# Patient Record
Sex: FEMALE | Race: WHITE | NOT HISPANIC OR LATINO | Employment: FULL TIME | URBAN - METROPOLITAN AREA
[De-identification: names, ages, dates, MRNs, and addresses within clinical notes are randomized per-mention and may not be internally consistent; named-entity substitution may affect disease eponyms.]

---

## 2017-01-18 ENCOUNTER — RADIANT APPOINTMENT (OUTPATIENT)
Dept: MAMMOGRAPHY | Facility: CLINIC | Age: 42
End: 2017-01-18
Attending: OBSTETRICS & GYNECOLOGY
Payer: COMMERCIAL

## 2017-01-18 DIAGNOSIS — Z12.31 VISIT FOR SCREENING MAMMOGRAM: ICD-10-CM

## 2017-01-18 PROCEDURE — G0202 SCR MAMMO BI INCL CAD: HCPCS | Mod: TC

## 2017-04-19 ENCOUNTER — OFFICE VISIT (OUTPATIENT)
Dept: OBGYN | Facility: CLINIC | Age: 42
End: 2017-04-19
Payer: COMMERCIAL

## 2017-04-19 VITALS
DIASTOLIC BLOOD PRESSURE: 62 MMHG | HEIGHT: 67 IN | BODY MASS INDEX: 22.76 KG/M2 | WEIGHT: 145 LBS | SYSTOLIC BLOOD PRESSURE: 110 MMHG | OXYGEN SATURATION: 98 % | HEART RATE: 90 BPM

## 2017-04-19 DIAGNOSIS — R30.0 DYSURIA: ICD-10-CM

## 2017-04-19 DIAGNOSIS — N76.0 ACUTE VAGINITIS: Primary | ICD-10-CM

## 2017-04-19 LAB
ALBUMIN UR-MCNC: NEGATIVE MG/DL
APPEARANCE UR: CLEAR
BACTERIA #/AREA URNS HPF: ABNORMAL /HPF
BILIRUB UR QL STRIP: NEGATIVE
COLOR UR AUTO: YELLOW
GLUCOSE UR STRIP-MCNC: NEGATIVE MG/DL
HGB UR QL STRIP: ABNORMAL
KETONES UR STRIP-MCNC: NEGATIVE MG/DL
LEUKOCYTE ESTERASE UR QL STRIP: NEGATIVE
MICRO REPORT STATUS: NORMAL
MUCOUS THREADS #/AREA URNS LPF: PRESENT /LPF
NITRATE UR QL: NEGATIVE
NON-SQ EPI CELLS #/AREA URNS LPF: ABNORMAL /LPF
PH UR STRIP: 5 PH (ref 5–7)
RBC #/AREA URNS AUTO: ABNORMAL /HPF (ref 0–2)
SP GR UR STRIP: 1.02 (ref 1–1.03)
SPECIMEN SOURCE: NORMAL
URN SPEC COLLECT METH UR: ABNORMAL
UROBILINOGEN UR STRIP-ACNC: 0.2 EU/DL (ref 0.2–1)
WBC #/AREA URNS AUTO: ABNORMAL /HPF (ref 0–2)
WET PREP SPEC: NORMAL

## 2017-04-19 PROCEDURE — 87210 SMEAR WET MOUNT SALINE/INK: CPT | Performed by: ADVANCED PRACTICE MIDWIFE

## 2017-04-19 PROCEDURE — 99213 OFFICE O/P EST LOW 20 MIN: CPT | Performed by: ADVANCED PRACTICE MIDWIFE

## 2017-04-19 PROCEDURE — 87086 URINE CULTURE/COLONY COUNT: CPT | Performed by: ADVANCED PRACTICE MIDWIFE

## 2017-04-19 PROCEDURE — 81001 URINALYSIS AUTO W/SCOPE: CPT | Performed by: ADVANCED PRACTICE MIDWIFE

## 2017-04-19 RX ORDER — NITROFURANTOIN 25; 75 MG/1; MG/1
100 CAPSULE ORAL 2 TIMES DAILY
Qty: 14 CAPSULE | Refills: 0 | Status: SHIPPED | OUTPATIENT
Start: 2017-04-19 | End: 2019-10-09

## 2017-04-19 NOTE — MR AVS SNAPSHOT
After Visit Summary   4/19/2017    Daniella Canela    MRN: [id number]           Patient Information     Date Of Birth          1975        Visit Information        Provider Department      4/19/2017 9:15 AM Donna Rizvi APRN CNM [facility]        Today's Diagnoses     Acute vaginitis    -  1    Dysuria          Care Instructions    Bladder Infection (UTI'S)    To help reduce the symptoms of your bladder infection:       Drink 8-10 glasses of water every day      Decrease caffeine, sugar and alcohol      Take all of the medication as it has been prescribed, don't stop before the last dose is gone      You may use OTC Uristat or Pyridium (this can turn your urine orange)  to soothe your bladder and reduce the burning but be aware that it may stain your clothing      Take Ibuprofen as directed for pain (not in pregnancy)      Take Vitamin C:  250-500 mg a day, Zinc: 30-50 mg day      Cranactin (tableted cranberry juice concentrate) take 1-2 tablets every 3-4 hours with plenty of water        To prevent future urinary tract infections:    AVOID CHEMICAL IRRITANTS:  Bath gels, perfumed products, deodorant pads or tampons, douching    CLOTHING: That increases moisture and bacterial growth:  nylon, Lycra, panty liners, tight clothing and thong underwear      ACIDIFY URINE: Cranberry tablets (Cranactin) or juice (naturally sweetened) to acidify urine and decrease bacterial growth.     URINATE:  Frequently and before and after intercourse.    If bladder infections are a common problem for you, consider washing your vaginal area before intercourse as well.       If symptoms persist or you experience fever, chills or back pain, please call:    Vaginitis (Vaginal Irritation/Infection)    Vaginitis is very common!  The most common vaginal infections are bacterial vaginosis or yeast. These infections are not sexually transmitted but can be incredibly uncomfortable.  Seek care from your midwife if signs or symptoms arise.     Normal vaginal discharge:      Is white, clear, thick or thin (it may change depending on where you are in your cycle)    Does not have a foul odor    The amount of discharge varies    Abnormal discharge/symptoms:       Itching in and around the vagina    Redness, pain or swelling    Discharge that is foamy, greenish, curd like, or bloody    Foul smelling odor    Pain when urinating or having sex    Fever    Causes of vaginal infections:      Good bacteria from the vagina have been destroyed by bad bacteria    Reaction to something in the vagina such as a tampon or scented/perfumed soaps or bubble bath    STI's    Sensitivities to soaps/detergents/dryer sheets, lubricants, etc.    Hormonal changes    Recent use of antibiotics     Infections can also occur after you've had intercourse with a new partner or if you have had frequent intercourse         Here is a list of suggestions that may help prevent/treat vaginal infections and will help maintain a healthy vaginal environment:      1.  Boosting your immune system so you can heal faster      Make sure you are getting adequate sleep    Drink 2-3 quarts of fluids per day, Cranberries or cranberry juice (unsweetened)    Eat more nuts, grains, raw veggies, yogurt, jarvis, grapefruit    Decrease intake of refined sugar, red meat and alcohol    Echinacea - 3 times a day for chronic problem or every 2 hours for acute symptoms; use as directed on bottle          2.  Changing the vaginal environment to a more acid state       Soak in a warm bath tub with one cup of vinegar or lemon juice. Do not use scented soap, bubble bath, or oils.     Acidophilus capsules:  1 in your vagina at bedtime for 5-7 nights    Herbal sitz bath or reed-wash with:  TBSP tea tree oil or 2 TBS cider vinegar      3.  Increasing the good healthy bacteria      At each meal drink 1 tsp apple cider vinegar and 1 tsp honey in   cup warm  water    Eat garlic daily, capsules or fresh.      Take probiotics 4-8 billion units/day      4.  Preventive measures      Wear cotton underwear, no thongs.  Do not wear tight clothes or pantyhose    Shower soon after working or change out of sweaty clothing     Do not wear underwear to bed.  The vaginal environment needs to breathe    Never douche or use vaginal , the vagina is self-cleaning!    Use white, unscented toilet paper.  Do not use baby wipes.  Wipe from front to back    Use only unscented tampons and pads, buy organic products if desired    Do not use perfumes/oils/lotions near your vagina or take bubble baths    Use only mild, unscented soaps around your vaginal area     Do not use fabric softeners/dryer sheets    Use gentle, unscented detergent, consider buying non-petroleum based detergents    Use only water based lubricants during sexual contact    Abstain from intercourse during times of infection    Alternative Treatment  Boric acid capsules one per vagina (not by mouth!!! Very toxic if taken orally) at bedtime for 5 days (or as suggested by your provider) may be an effective alternative treatment and also more effective for those with chronic yeast vaginitis. Boric acid is available at the pharmacy but must be purchased along with gelatin caps for insertion. It might also be available at a local compounding pharmacy. Boric acid is not safe for pregnant women. Discuss with your midwife if this treatment interests you.     If your symptoms do not resolve or if you have questions please call:                             Follow-ups after your visit        Who to contact     If you have questions or need follow up information about today's clinic visit or your schedule please contact [facility] directly at [phone].  Normal or non-critical lab and imaging results will be communicated to you by MyChart, letter or phone within 4 business days after the clinic has  "received the results. If you do not hear from us within 7 days, please contact the clinic through [employer] or phone. If you have a critical or abnormal lab result, we will notify you by phone as soon as possible.  Submit refill requests through [employer] or call your pharmacy and they will forward the refill request to us. Please allow 3 business days for your refill to be completed.          Additional Information About Your Visit        [employer] Information     [employer] lets you send messages to your doctor, view your test results, renew your prescriptions, schedule appointments and more. To sign up, go to [email or web address] . Click on \"Log in\" on the left side of the screen, which will take you to the Welcome page. Then click on \"Sign up Now\" on the right side of the page.     You will be asked to enter the access code listed below, as well as some personal information. Please follow the directions to create your username and password.     Your access code is: [email or web address]  Expires: 2017  9:27 AM     Your access code will  in 90 days. If you need help or a new code, please call your [facility] clinic or [phone].        Care EveryWhere ID     This is your Care EveryWhere ID. This could be used by other organizations to access your [facility] medical records  [id number]        Your Vitals Were     Pulse Height Pulse Oximetry BMI (Body Mass Index)          90 5' 7\" (1.702 m) 98% 22.71 kg/m2         Blood Pressure from Last 3 Encounters:   17 110/62   12/08/15 120/76   03/09/15 135/80    Weight from Last 3 Encounters:   17 145 lb (65.8 kg)   12/08/15 165 lb (74.8 kg)   03/09/15 157 lb (71.2 kg)              We Performed the Following     UA with Microscopic     Wet prep        Primary Care Provider    None       No address on file        Thank you!     Thank you for choosing [facility]  for your care. Our goal is always to provide you with excellent care. Hearing " back from our patients is one way we can continue to improve our services. Please take a few minutes to complete the written survey that you may receive in the mail after your visit with us. Thank you!             Your Updated Medication List - Protect others around you: Learn how to safely use, store and throw away your medicines at www.disposemymeds.org.          This list is accurate as of: 4/19/17  9:27 AM.  Always use your most recent med list.                   Brand Name Dispense Instructions for use    ADVIL PO      Take 400 mg by mouth Reported on 4/19/2017

## 2017-04-19 NOTE — NURSING NOTE
"Chief Complaint   Patient presents with     Vaginal Problem   x10 days burning upon urination and white discharge   Initial /62 (BP Location: Right arm, Patient Position: Chair, Cuff Size: Adult Regular)  Pulse 90  Ht 5' 7\" (1.702 m)  Wt 145 lb (65.8 kg)  SpO2 98%  BMI 22.71 kg/m2 Estimated body mass index is 22.71 kg/(m^2) as calculated from the following:    Height as of this encounter: 5' 7\" (1.702 m).    Weight as of this encounter: 145 lb (65.8 kg).  Medication Reconciliation: complete   Audrey Lange MA      "

## 2017-04-19 NOTE — PROGRESS NOTES
Please try to get a hold of patient, unable to get through. Most likely has UTI, will send macrobid to pharmacy and will await culture results. Thanks!    DEVON Lockett, CNM

## 2017-04-19 NOTE — PROGRESS NOTES
"SUBJECTIVE:   Daniella is a 42 year old here for vaginal symptoms: burning, it comes and goes but something doesn't seem normal             Vaginal Symptoms     Onset: on and off for a while    Description:  Vaginal Discharge: white and clear  Itching (Pruritis): No  Burning sensation:  Yes  Odor:  No  Irritation:  Yes    Accompanying Signs & Symptoms:  Pain with Urination: Yes  Abdominal Pain:  No  Fever: No   History:   Sexually active:  Yes  New Partner:  No  Possibility of Pregnancy:  No  Contraceptive type: status post hysterectomy    Precipitating factors:   Recent Antibiotic Use: No    Alleviating factors:     Therapies Tried and outcome:   Previous Episodes of Vaginitis:  No      Other associated symptoms: none.  History of STI's:  No  STI Testing offered: YES    LMP: No LMP recorded. Patient has had a hysterectomy.      There is no problem list on file for this patient.    Past Medical History:   Diagnosis Date     NO ACTIVE PROBLEMS      Past Surgical History:   Procedure Laterality Date     HYSTERECTOMY, VAGINAL  9-9-2009     Current Outpatient Prescriptions   Medication Sig Dispense Refill     nitrofurantoin, macrocrystal-monohydrate, (MACROBID) 100 MG capsule Take 1 capsule (100 mg) by mouth 2 times daily 14 capsule 0     Ibuprofen (ADVIL PO) Take 400 mg by mouth Reported on 4/19/2017       Allergies   Allergen Reactions     Codeine Sulfate Rash       Health maintenance updated:  no    ROS:   12 point review of systems negative other than symptoms noted below  : Vaginal burning and burning with urination    PHYSICAL EXAM:    /62 (BP Location: Right arm, Patient Position: Chair, Cuff Size: Adult Regular)  Pulse 90  Ht 5' 7\" (1.702 m)  Wt 145 lb (65.8 kg)  SpO2 98%  BMI 22.71 kg/m2, Body mass index is 22.71 kg/(m^2).  General appearance:  healthy, alert and no distress  Pelvic Exam:  Vulva: No lesions, no adenopathy, BUS:  wnl.   Vagina: Moist, pink, discharge normal  well rugated, no " lesions  Cervix: surgically absent  Rectal exam: deferred    ASSESSMENT/PLAN:     ICD-10-CM    1. Acute vaginitis N76.0 Wet prep   2. Dysuria R30.0 UA with Microscopic     Urine Culture Aerobic Bacterial     nitrofurantoin, macrocrystal-monohydrate, (MACROBID) 100 MG capsule       Results for orders placed or performed in visit on 04/19/17   UA with Microscopic   Result Value Ref Range    Color Urine Yellow     Appearance Urine Clear     Glucose Urine Negative NEG mg/dL    Bilirubin Urine Negative NEG    Ketones Urine Negative NEG mg/dL    Specific Gravity Urine 1.025 1.003 - 1.035    pH Urine 5.0 5.0 - 7.0 pH    Protein Albumin Urine Negative NEG mg/dL    Urobilinogen Urine 0.2 0.2 - 1.0 EU/dL    Nitrite Urine Negative NEG    Blood Urine Trace (A) NEG    Leukocyte Esterase Urine Negative NEG    Source Midstream Urine     WBC Urine O - 2 0 - 2 /HPF    RBC Urine O - 2 0 - 2 /HPF    Squamous Epithelial /LPF Urine Moderate (A) FEW /LPF    Bacteria Urine Few (A) NEG /HPF    Mucous Urine Present (A) NEG /LPF   Wet prep   Result Value Ref Range    Specimen Description Vagina     Wet Prep       No Trichomonas seen  No clue cells seen  No yeast seen      Micro Report Status FINAL 04/19/2017          COUNSELING:l  Use a probiotic when taking antibiotics  Abstain from sexual intercourse while being treated for vaginal infection  Handout provided about vaginitis and UTI prevention and how to prevent future infections.  Negative Wet prep  Positive UA, RX sent to pharmacy  Unable to get through on patients phone, routed results to triage for f/u  Return to clinic if symptoms persist or worsen    15 minutes was spent face to face with the patient today discussing her history, diagnosis, and follow-up plan as noted above. Over 50% of the visit was spent in counseling and coordination of care.    Total Visit Time: 15 minutes.     DEVON Lockett, PAUL

## 2017-04-19 NOTE — PATIENT INSTRUCTIONS
Bladder Infection (UTI'S)    To help reduce the symptoms of your bladder infection:       Drink 8-10 glasses of water every day      Decrease caffeine, sugar and alcohol      Take all of the medication as it has been prescribed, don't stop before the last dose is gone      You may use OTC Uristat or Pyridium (this can turn your urine orange)  to soothe your bladder and reduce the burning but be aware that it may stain your clothing      Take Ibuprofen as directed for pain (not in pregnancy)      Take Vitamin C:  250-500 mg a day, Zinc: 30-50 mg day      Cranactin (tableted cranberry juice concentrate) take 1-2 tablets every 3-4 hours with plenty of water        To prevent future urinary tract infections:    AVOID CHEMICAL IRRITANTS:  Bath gels, perfumed products, deodorant pads or tampons, douching    CLOTHING: That increases moisture and bacterial growth:  nylon, Lycra, panty liners, tight clothing and thong underwear      ACIDIFY URINE: Cranberry tablets (Cranactin) or juice (naturally sweetened) to acidify urine and decrease bacterial growth.     URINATE:  Frequently and before and after intercourse.    If bladder infections are a common problem for you, consider washing your vaginal area before intercourse as well.       If symptoms persist or you experience fever, chills or back pain, please call:    Vaginitis (Vaginal Irritation/Infection)    Vaginitis is very common!  The most common vaginal infections are bacterial vaginosis or yeast. These infections are not sexually transmitted but can be incredibly uncomfortable. Seek care from your midwife if signs or symptoms arise.     Normal vaginal discharge:      Is white, clear, thick or thin (it may change depending on where you are in your cycle)    Does not have a foul odor    The amount of discharge varies    Abnormal discharge/symptoms:       Itching in and around the vagina    Redness, pain or swelling    Discharge that is foamy, greenish, curd like, or  bloody    Foul smelling odor    Pain when urinating or having sex    Fever    Causes of vaginal infections:      Good bacteria from the vagina have been destroyed by bad bacteria    Reaction to something in the vagina such as a tampon or scented/perfumed soaps or bubble bath    STI's    Sensitivities to soaps/detergents/dryer sheets, lubricants, etc.    Hormonal changes    Recent use of antibiotics     Infections can also occur after you've had intercourse with a new partner or if you have had frequent intercourse         Here is a list of suggestions that may help prevent/treat vaginal infections and will help maintain a healthy vaginal environment:      1.  Boosting your immune system so you can heal faster      Make sure you are getting adequate sleep    Drink 2-3 quarts of fluids per day, Cranberries or cranberry juice (unsweetened)    Eat more nuts, grains, raw veggies, yogurt, jarvis, grapefruit    Decrease intake of refined sugar, red meat and alcohol    Echinacea - 3 times a day for chronic problem or every 2 hours for acute symptoms; use as directed on bottle          2.  Changing the vaginal environment to a more acid state       Soak in a warm bath tub with one cup of vinegar or lemon juice. Do not use scented soap, bubble bath, or oils.     Acidophilus capsules:  1 in your vagina at bedtime for 5-7 nights    Herbal sitz bath or reed-wash with:  TBSP tea tree oil or 2 TBS cider vinegar      3.  Increasing the good healthy bacteria      At each meal drink 1 tsp apple cider vinegar and 1 tsp honey in   cup warm water    Eat garlic daily, capsules or fresh.      Take probiotics 4-8 billion units/day      4.  Preventive measures      Wear cotton underwear, no thongs.  Do not wear tight clothes or pantyhose    Shower soon after working or change out of sweaty clothing     Do not wear underwear to bed.  The vaginal environment needs to breathe    Never douche or use vaginal , the vagina is  self-cleaning!    Use white, unscented toilet paper.  Do not use baby wipes.  Wipe from front to back    Use only unscented tampons and pads, buy organic products if desired    Do not use perfumes/oils/lotions near your vagina or take bubble baths    Use only mild, unscented soaps around your vaginal area     Do not use fabric softeners/dryer sheets    Use gentle, unscented detergent, consider buying non-petroleum based detergents    Use only water based lubricants during sexual contact    Abstain from intercourse during times of infection    Alternative Treatment  Boric acid capsules one per vagina (not by mouth!!! Very toxic if taken orally) at bedtime for 5 days (or as suggested by your provider) may be an effective alternative treatment and also more effective for those with chronic yeast vaginitis. Boric acid is available at the pharmacy but must be purchased along with gelatin caps for insertion. It might also be available at a local compounding pharmacy. Boric acid is not safe for pregnant women. Discuss with your midwife if this treatment interests you.     If your symptoms do not resolve or if you have questions please call:

## 2017-04-19 NOTE — LETTER
[facility]  [address] [address] [phone]          Daniella Canela  [address]  [facility] [id number]          Dear Daniella,        The results of your recent urine culture were NORMAL.  If you have any further questions or problems, please contact our office.    Sincerely,      Donna OSORIO CNM

## 2017-04-20 LAB
BACTERIA SPEC CULT: NORMAL
MICRO REPORT STATUS: NORMAL
SPECIMEN SOURCE: NORMAL

## 2017-11-08 ENCOUNTER — OFFICE VISIT (OUTPATIENT)
Dept: URGENT CARE | Facility: URGENT CARE | Age: 42
End: 2017-11-08
Payer: COMMERCIAL

## 2017-11-08 VITALS
DIASTOLIC BLOOD PRESSURE: 68 MMHG | OXYGEN SATURATION: 99 % | SYSTOLIC BLOOD PRESSURE: 100 MMHG | WEIGHT: 150.2 LBS | HEART RATE: 64 BPM | BODY MASS INDEX: 23.52 KG/M2 | TEMPERATURE: 98.4 F

## 2017-11-08 DIAGNOSIS — E16.2 HYPOGLYCEMIA: ICD-10-CM

## 2017-11-08 DIAGNOSIS — R42 DIZZINESS: ICD-10-CM

## 2017-11-08 DIAGNOSIS — R55 PRE-SYNCOPE: Primary | ICD-10-CM

## 2017-11-08 LAB
ANION GAP SERPL CALCULATED.3IONS-SCNC: 5 MMOL/L (ref 3–14)
BUN SERPL-MCNC: 9 MG/DL (ref 7–30)
CALCIUM SERPL-MCNC: 9 MG/DL (ref 8.5–10.1)
CHLORIDE SERPL-SCNC: 102 MMOL/L (ref 94–109)
CO2 SERPL-SCNC: 32 MMOL/L (ref 20–32)
CREAT SERPL-MCNC: 0.66 MG/DL (ref 0.52–1.04)
ERYTHROCYTE [DISTWIDTH] IN BLOOD BY AUTOMATED COUNT: 11.7 % (ref 10–15)
GFR SERPL CREATININE-BSD FRML MDRD: >90 ML/MIN/1.7M2
GLUCOSE SERPL-MCNC: 66 MG/DL (ref 70–99)
HCT VFR BLD AUTO: 40.3 % (ref 35–47)
HGB BLD-MCNC: 13.3 G/DL (ref 11.7–15.7)
MCH RBC QN AUTO: 32.8 PG (ref 26.5–33)
MCHC RBC AUTO-ENTMCNC: 33 G/DL (ref 31.5–36.5)
MCV RBC AUTO: 99 FL (ref 78–100)
PLATELET # BLD AUTO: 290 10E9/L (ref 150–450)
POTASSIUM SERPL-SCNC: 3.9 MMOL/L (ref 3.4–5.3)
RBC # BLD AUTO: 4.06 10E12/L (ref 3.8–5.2)
SODIUM SERPL-SCNC: 139 MMOL/L (ref 133–144)
WBC # BLD AUTO: 6.3 10E9/L (ref 4–11)

## 2017-11-08 PROCEDURE — 85027 COMPLETE CBC AUTOMATED: CPT | Performed by: INTERNAL MEDICINE

## 2017-11-08 PROCEDURE — 99214 OFFICE O/P EST MOD 30 MIN: CPT | Performed by: INTERNAL MEDICINE

## 2017-11-08 PROCEDURE — 80048 BASIC METABOLIC PNL TOTAL CA: CPT | Performed by: INTERNAL MEDICINE

## 2017-11-08 PROCEDURE — 93000 ELECTROCARDIOGRAM COMPLETE: CPT | Performed by: INTERNAL MEDICINE

## 2017-11-08 PROCEDURE — 36415 COLL VENOUS BLD VENIPUNCTURE: CPT | Performed by: INTERNAL MEDICINE

## 2017-11-08 NOTE — NURSING NOTE
"Chief Complaint   Patient presents with     Dizziness     x today around 1 pm . Pt states that she also had tingling sensation in hands on and off today        Initial /68  Pulse 64  Temp 98.4  F (36.9  C) (Oral)  Wt 150 lb 3.2 oz (68.1 kg)  SpO2 99%  BMI 23.52 kg/m2 Estimated body mass index is 23.52 kg/(m^2) as calculated from the following:    Height as of 4/19/17: 5' 7\" (1.702 m).    Weight as of this encounter: 150 lb 3.2 oz (68.1 kg).  Medication Reconciliation: complete    "

## 2017-11-08 NOTE — MR AVS SNAPSHOT
After Visit Summary   11/8/2017    Daniella Canela    MRN: [id number]           Patient Information     Date Of Birth          1975        Visit Information        Provider Department      11/8/2017 3:40 PM Miky Cage MD [facility]        Today's Diagnoses     Pre-syncope    -  1    Dizziness        Hypoglycemia          Care Instructions    Your EKG (heart tracing) is normal.  Physical exam is normal as well.  Based upon your history and physical exam, I see no evidence of heart problems or other vascular problems as a cause of your dizziness.  The blood sugar is somewhat low -- this could be a factor.  Hard to know for sure.      Overall, the symptoms are most consistent with a vasovagal (fainting) episode that didn't go all the way to fainting.  This is due to a brief imbalance of your autonomic nervous system (the part of the nervous system that controls your unconscious body functions like blood pressure, heart rate, digestion).  The autonomic nervous system is in a constant state of balance and sometimes the balance gets thrown off kilter causing a fainting spell. In your case, it just feels like your going to faint but the problem corrects itself before you actually faint.  This can happen due to a large number of stressors (both physical and mental).  The main way to restore good balance is to focus on getting adequate sleep, getting regular moderate exercise, eating a balanced diet and maintaining good hydration.  Avoid over-the-counter medications that can have an influence on this system (especially caffeine and decongestants).            Follow-ups after your visit        Who to contact     If you have questions or need follow up information about today's clinic visit or your schedule please contact [facility] directly at [phone].  Normal or non-critical lab and imaging results will be communicated to you  "by Mapluckhart, letter or phone within 4 business days after the clinic has received the results. If you do not hear from us within 7 days, please contact the clinic through [employer] or phone. If you have a critical or abnormal lab result, we will notify you by phone as soon as possible.  Submit refill requests through eGenerations or call your pharmacy and they will forward the refill request to us. Please allow 3 business days for your refill to be completed.          Additional Information About Your Visit        MapluckharEyeNetra Information     eGenerations lets you send messages to your doctor, view your test results, renew your prescriptions, schedule appointments and more. To sign up, go to [email or web address] . Click on \"Log in\" on the left side of the screen, which will take you to the Welcome page. Then click on \"Sign up Now\" on the right side of the page.     You will be asked to enter the access code listed below, as well as some personal information. Please follow the directions to create your username and password.     Your access code is: [email or web address]  Expires: 2018  5:45 PM     Your access code will  in 90 days. If you need help or a new code, please call your [place] clinic or [phone].        Care EveryWhere ID     This is your Care EveryWhere ID. This could be used by other organizations to access your [place] medical records  [id number]        Your Vitals Were     Pulse Temperature Pulse Oximetry BMI (Body Mass Index)          64 98.4  F (36.9  C) (Oral) 99% 23.52 kg/m2         Blood Pressure from Last 3 Encounters:   17 100/68   17 110/62   12/08/15 120/76    Weight from Last 3 Encounters:   17 150 lb 3.2 oz (68.1 kg)   17 145 lb (65.8 kg)   12/08/15 165 lb (74.8 kg)              We Performed the Following     Basic metabolic panel  (Ca, Cl, CO2, Creat, Gluc, K, Na, BUN)     CBC with platelets     EKG 12-lead complete w/read - Clinics        Primary Care Provider    " Physician No Ref-Primary       NO REF-PRIMARY PHYSICIAN        Equal Access to Services     PETER CAICEDO : Hadii aad ku hadfadumoenrique Davenport, yaneth caro, elyelle ramírezmavel serna. So [facility] [phone].    ATENCIÓN: Si habla español, tiene a adamson disposición servicios gratuitos de asistencia lingüística. Llame al [phone].    We comply with applicable federal civil rights laws and [place] laws. We do not discriminate on the basis of race, color, national origin, age, disability, sex, sexual orientation, or gender identity.            Thank you!     Thank you for choosing [place] [facility]  for your care. Our goal is always to provide you with excellent care. Hearing back from our patients is one way we can continue to improve our services. Please take a few minutes to complete the written survey that you may receive in the mail after your visit with us. Thank you!             Your Updated Medication List - Protect others around you: Learn how to safely use, store and throw away your medicines at www.disposemymeds.org.          This list is accurate as of: 11/8/17  5:45 PM.  Always use your most recent med list.                   Brand Name Dispense Instructions for use Diagnosis    ADVIL PO      Take 400 mg by mouth Reported on 4/19/2017        nitroFURantoin (macrocrystal-monohydrate) 100 MG capsule    MACROBID    14 capsule    Take 1 capsule (100 mg) by mouth 2 times daily    Dysuria

## 2017-11-08 NOTE — PATIENT INSTRUCTIONS
Your EKG (heart tracing) is normal.  Physical exam is normal as well.  Based upon your history and physical exam, I see no evidence of heart problems or other vascular problems as a cause of your dizziness.  The blood sugar is somewhat low -- this could be a factor.  Hard to know for sure.      Overall, the symptoms are most consistent with a vasovagal (fainting) episode that didn't go all the way to fainting.  This is due to a brief imbalance of your autonomic nervous system (the part of the nervous system that controls your unconscious body functions like blood pressure, heart rate, digestion).  The autonomic nervous system is in a constant state of balance and sometimes the balance gets thrown off kilter causing a fainting spell. In your case, it just feels like your going to faint but the problem corrects itself before you actually faint.  This can happen due to a large number of stressors (both physical and mental).  The main way to restore good balance is to focus on getting adequate sleep, getting regular moderate exercise, eating a balanced diet and maintaining good hydration.  Avoid over-the-counter medications that can have an influence on this system (especially caffeine and decongestants).

## 2017-11-08 NOTE — PROGRESS NOTES
SUBJECTIVE:  Daniella Canela, a 42 year old female, presents for evaluation of dizziness.  She describes a sense of falling to her left.  Associated feeling of numbness/tingling in both hands.  This sensation was brief, lasting less than 30 seconds, occurred after she had been walking around.  The numbness/tingling also was brief and transient.  Denies associated chest pains, palpitations, shortness of breath.  No nausea.  No associated flush or cold feeling.      PMH: No chronic medical conditions; no prior hospitalizations    PSH: hysterectomy    FAMH: Mother with heart disease    SOCH; under more stress    ROS:  The following systems have been completely reviewed and are negative except as noted in the HPI: CONSTITUTIONAL, EYE, HEAD AND NECK, CARDIOVASCULAR, PULMONARY, GASTROINTESTINAL, RENAL, ENDOCRINE, NEUROLOGIC and PSYCHIATRIC     OBJECTIVE:  /68  Pulse 64  Temp 98.4  F (36.9  C) (Oral)  Wt 150 lb 3.2 oz (68.1 kg)  SpO2 99%  BMI 23.52 kg/m2  GENERAL: healthy, alert and no distress  EYES: PERRLA, EOMI, conjunctivae and sclerae clear, fundi are benign with sharp optic discs  HENT: ear canals and TM's normal and nose and mouth without ulcers or lesions  NECK: single palpable submental lymph node on the left just below the mandible (< 1 cm, non tender, not changing over years); no thyromegaly  RESP: clear to auscultation and percussion bilaterally; normal I:E ratio  CV: regular rates and rhythm, normal S1 S2, no S3 or S4 and no murmur, click or rub -  ABDOMEN: soft, nontender, without hepatosplenomegaly or masses and bowel sounds normal  EXT: no cyanosis, clubbing or edema; peripheral pulses are brisk and symmetric in the radials, dorsalis pedis and posterior tibials bilaterally  NEURO: CN 2-12 intact, strength 5/5 throughout, normal DTRs in patellar and biceps tendons bilaterally, sensation grossly intact, no ataxia on finger-to-nose testing, mental status alert and oriented x 3    LAB:    Recent Labs   Lab Test  11/08/17   1709   WBC  6.3   RBC  4.06   HGB  13.3   HCT  40.3   MCV  99   MCH  32.8   MCHC  33.0   RDW  11.7   PLT  290     Recent Labs   Lab Test  11/08/17   1709  12/08/15   0940   NA  139  139   POTASSIUM  3.9  3.9   CHLORIDE  102  104   CO2  32  29   BUN  9  14   CR  0.66  0.64   GLC  66*  86       EKG, which I have personally reviewed and interpreted, shows NSR with normal intervals and no ischemic findings.    ASSESSMENT/PLAN:    ICD-10-CM    1. Pre-syncope R55 Considered differential diagnosis including cardiac arrhythmia, valve disorders (including MVP), cardiomyopathy, vestibular imbalance, basilar insufficiency, neurocardiogenic syncope.  The pattern of symptoms is most consistent with a neurocardiogenic source with a benign vasovagal mechanism.  There is no evidence of conduction disorder or other cardiovascular disease.  Reassurance to the patient.   2. Dizziness R42 EKG 12-lead complete w/read - Clinics     CBC with platelets     Basic metabolic panel  (Ca, Cl, CO2, Creat, Gluc, K, Na, BUN)   3. Hypoglycemia E16.2 Significance of this finding right now is unclear as we have not satisfied Whipple's triad with hypoglycemia.  Most important to focus on regular and balanced diet.       Miky Cage MD

## 2018-06-21 ENCOUNTER — RADIANT APPOINTMENT (OUTPATIENT)
Dept: MAMMOGRAPHY | Facility: CLINIC | Age: 43
End: 2018-06-21
Attending: ADVANCED PRACTICE MIDWIFE
Payer: COMMERCIAL

## 2018-06-21 DIAGNOSIS — Z12.31 VISIT FOR SCREENING MAMMOGRAM: ICD-10-CM

## 2018-06-21 PROCEDURE — 77067 SCR MAMMO BI INCL CAD: CPT | Mod: TC

## 2018-07-04 ENCOUNTER — OFFICE VISIT (OUTPATIENT)
Dept: URGENT CARE | Facility: URGENT CARE | Age: 43
End: 2018-07-04
Payer: COMMERCIAL

## 2018-07-04 VITALS
TEMPERATURE: 97.9 F | RESPIRATION RATE: 16 BRPM | DIASTOLIC BLOOD PRESSURE: 60 MMHG | SYSTOLIC BLOOD PRESSURE: 112 MMHG | WEIGHT: 152.19 LBS | HEART RATE: 88 BPM | BODY MASS INDEX: 23.84 KG/M2

## 2018-07-04 DIAGNOSIS — R10.30 LOWER ABDOMINAL PAIN: Primary | ICD-10-CM

## 2018-07-04 DIAGNOSIS — R11.2 NAUSEA AND VOMITING, INTRACTABILITY OF VOMITING NOT SPECIFIED, UNSPECIFIED VOMITING TYPE: ICD-10-CM

## 2018-07-04 DIAGNOSIS — R42 DIZZINESS: ICD-10-CM

## 2018-07-04 LAB
ALBUMIN UR-MCNC: NEGATIVE MG/DL
ANION GAP SERPL CALCULATED.3IONS-SCNC: 4 MMOL/L (ref 3–14)
APPEARANCE UR: CLEAR
BACTERIA #/AREA URNS HPF: ABNORMAL /HPF
BASOPHILS # BLD AUTO: 0 10E9/L (ref 0–0.2)
BASOPHILS NFR BLD AUTO: 0.2 %
BILIRUB UR QL STRIP: NEGATIVE
BUN SERPL-MCNC: 10 MG/DL (ref 7–30)
CALCIUM SERPL-MCNC: 9.1 MG/DL (ref 8.5–10.1)
CHLORIDE SERPL-SCNC: 106 MMOL/L (ref 94–109)
CO2 SERPL-SCNC: 31 MMOL/L (ref 20–32)
COLOR UR AUTO: YELLOW
CREAT SERPL-MCNC: 0.71 MG/DL (ref 0.52–1.04)
DIFFERENTIAL METHOD BLD: NORMAL
EOSINOPHIL # BLD AUTO: 0 10E9/L (ref 0–0.7)
EOSINOPHIL NFR BLD AUTO: 0.5 %
ERYTHROCYTE [DISTWIDTH] IN BLOOD BY AUTOMATED COUNT: 11.5 % (ref 10–15)
GFR SERPL CREATININE-BSD FRML MDRD: 89 ML/MIN/1.7M2
GLUCOSE SERPL-MCNC: 93 MG/DL (ref 70–99)
GLUCOSE UR STRIP-MCNC: NEGATIVE MG/DL
HCT VFR BLD AUTO: 40.5 % (ref 35–47)
HGB BLD-MCNC: 13.5 G/DL (ref 11.7–15.7)
HGB UR QL STRIP: NEGATIVE
KETONES UR STRIP-MCNC: NEGATIVE MG/DL
LEUKOCYTE ESTERASE UR QL STRIP: NEGATIVE
LYMPHOCYTES # BLD AUTO: 1.4 10E9/L (ref 0.8–5.3)
LYMPHOCYTES NFR BLD AUTO: 24.8 %
MCH RBC QN AUTO: 32.8 PG (ref 26.5–33)
MCHC RBC AUTO-ENTMCNC: 33.3 G/DL (ref 31.5–36.5)
MCV RBC AUTO: 99 FL (ref 78–100)
MONOCYTES # BLD AUTO: 0.5 10E9/L (ref 0–1.3)
MONOCYTES NFR BLD AUTO: 7.9 %
NEUTROPHILS # BLD AUTO: 3.8 10E9/L (ref 1.6–8.3)
NEUTROPHILS NFR BLD AUTO: 66.6 %
NITRATE UR QL: NEGATIVE
NON-SQ EPI CELLS #/AREA URNS LPF: ABNORMAL /LPF
PH UR STRIP: 6.5 PH (ref 5–7)
PLATELET # BLD AUTO: 250 10E9/L (ref 150–450)
POTASSIUM SERPL-SCNC: 3.8 MMOL/L (ref 3.4–5.3)
RBC # BLD AUTO: 4.11 10E12/L (ref 3.8–5.2)
RBC #/AREA URNS AUTO: ABNORMAL /HPF
SODIUM SERPL-SCNC: 141 MMOL/L (ref 133–144)
SOURCE: ABNORMAL
SP GR UR STRIP: 1.01 (ref 1–1.03)
UROBILINOGEN UR STRIP-ACNC: 0.2 EU/DL (ref 0.2–1)
WBC # BLD AUTO: 5.7 10E9/L (ref 4–11)
WBC #/AREA URNS AUTO: ABNORMAL /HPF

## 2018-07-04 PROCEDURE — 81001 URINALYSIS AUTO W/SCOPE: CPT | Performed by: FAMILY MEDICINE

## 2018-07-04 PROCEDURE — 99214 OFFICE O/P EST MOD 30 MIN: CPT | Mod: 25 | Performed by: FAMILY MEDICINE

## 2018-07-04 PROCEDURE — 96372 THER/PROPH/DIAG INJ SC/IM: CPT | Performed by: FAMILY MEDICINE

## 2018-07-04 PROCEDURE — 87591 N.GONORRHOEAE DNA AMP PROB: CPT | Performed by: FAMILY MEDICINE

## 2018-07-04 PROCEDURE — 80048 BASIC METABOLIC PNL TOTAL CA: CPT | Performed by: FAMILY MEDICINE

## 2018-07-04 PROCEDURE — 87491 CHLMYD TRACH DNA AMP PROBE: CPT | Performed by: FAMILY MEDICINE

## 2018-07-04 PROCEDURE — 85025 COMPLETE CBC W/AUTO DIFF WBC: CPT | Performed by: FAMILY MEDICINE

## 2018-07-04 PROCEDURE — 36415 COLL VENOUS BLD VENIPUNCTURE: CPT | Performed by: FAMILY MEDICINE

## 2018-07-04 RX ORDER — CEFTRIAXONE SODIUM 250 MG/1
250 INJECTION, POWDER, FOR SOLUTION INTRAMUSCULAR; INTRAVENOUS ONCE
Qty: 1.25 ML | Refills: 0 | OUTPATIENT
Start: 2018-07-04 | End: 2018-07-04

## 2018-07-04 RX ORDER — ONDANSETRON 8 MG/1
8 TABLET, FILM COATED ORAL EVERY 8 HOURS PRN
Qty: 9 TABLET | Refills: 0 | Status: SHIPPED | OUTPATIENT
Start: 2018-07-04 | End: 2018-07-07

## 2018-07-04 RX ORDER — DOXYCYCLINE HYCLATE 100 MG
100 TABLET ORAL 2 TIMES DAILY
Qty: 20 TABLET | Refills: 0 | Status: SHIPPED | OUTPATIENT
Start: 2018-07-04 | End: 2018-07-14

## 2018-07-04 NOTE — PROGRESS NOTES
SUBJECTIVE  HPI: Daniella Canela is a 43 year old female  who presents with the CC of abdominal/pelvic pain.   Pain is located in the suprapubic area, with radiation to None    The pain is characterized as cramping.    Pain has been present for 1 week(s) and is slowly progressive.     EXACERBATING FACTORS: NEGATIVE.   RELIEVING FACTORS: NEGATIVE.    ASSOCIATED SX: nausea and vomiting.     Past Medical History:   Diagnosis Date     Migraine      NO ACTIVE PROBLEMS        Past Surgical History:   Procedure Laterality Date     HYSTERECTOMY, VAGINAL  2009       Family History   Problem Relation Age of Onset     Other Cancer Father       Stomach Cancer     Coronary Artery Disease Mother        Social History   Substance Use Topics     Smoking status: Former Smoker     Smokeless tobacco: Never Used     Alcohol use No     ROS:CONSTITUTIONAL:NEGATIVE for fever, chills, change in weight    EXAMINATION:  /60 (Cuff Size: Adult Regular)  Pulse 88  Temp 97.9  F (36.6  C) (Oral)  Resp 16  Wt 152 lb 3 oz (69 kg)  BMI 23.84 kg/[id number] APPEARANCE: healthy, alert and no distress  ABDOMEN: soft, normal bowel sounds, tenderness moderate suprapubic, no gaurding/rigidity/rebound      ICD-10-CM    1. Lower abdominal pain R10.30 Basic metabolic panel  (Ca, Cl, CO2, Creat, Gluc, K, Na, BUN)     CBC with platelets differential     UA with Microscopic reflex to Culture     NEISSERIA GONORRHOEA PCR     CHLAMYDIA TRACHOMATIS PCR     cefTRIAXone (ROCEPHIN) 250 MG injection     doxycycline (VIBRA-TABS) 100 MG tablet     CANCELED: Beta HCG qual IFA urine   2. Nausea and vomiting, intractability of vomiting not specified, unspecified vomiting type R11.2 Basic metabolic panel  (Ca, Cl, CO2, Creat, Gluc, K, Na, BUN)     CBC with platelets differential     UA with Microscopic reflex to Culture     NEISSERIA GONORRHOEA PCR     CHLAMYDIA TRACHOMATIS PCR     ondansetron (ZOFRAN) 8 MG tablet     CANCELED: Beta HCG qual IFA  urine   3. Dizziness R42 Basic metabolic panel  (Ca, Cl, CO2, Creat, Gluc, K, Na, BUN)     CBC with platelets differential     UA with Microscopic reflex to Culture     CANCELED: Beta HCG qual IFA urine     F/U PCP/IM/FP, ED if worse

## 2018-07-04 NOTE — NURSING NOTE
MEDICATION: Rocephin 250 mg  ROUTE: IM  SITE:   Right UOQ - Gluteus  DOSE: 250 mg  LOT #: [id number]  :  [employer]  EXPIRATION DATE:  12/01/2020  NDC: 8654-7667-66   Given at 2:34pm.    MEDICATION: Lidocaine 0.9 cc   ROUTE: IM  SITE:   Right UOQ - Gluteus  DOSE: 0.9 cc  LOT #: [id number]  :  [employer]  EXPIRATION DATE:  10/01/2019  NDC: 9478-4717-37   Given at 2:34pm.    Prior to injection verified patient identity using patient's name and date of birth.  Due to injection administration, patient instructed to remain in clinic for 15 minutes  afterwards, and to report any adverse reaction to me immediately.  BRANNON Blanc MA

## 2018-07-04 NOTE — MR AVS SNAPSHOT
After Visit Summary   7/4/2018    Daniella Canela    MRN: [id number]           Patient Information     Date Of Birth          1975        Visit Information        Provider Department      7/4/2018 12:55 PM Lavell Buckley DO [facility]        Today's Diagnoses     Lower abdominal pain    -  1    Nausea and vomiting, intractability of vomiting not specified, unspecified vomiting type        Dizziness           Follow-ups after your visit        Who to contact     If you have questions or need follow up information about today's clinic visit or your schedule please contact [place] [facility] directly at [phone].  Normal or non-critical lab and imaging results will be communicated to you by MyChart, letter or phone within 4 business days after the clinic has received the results. If you do not hear from us within 7 days, please contact the clinic through MyChart or phone. If you have a critical or abnormal lab result, we will notify you by phone as soon as possible.  Submit refill requests through [employer] or call your pharmacy and they will forward the refill request to us. Please allow 3 business days for your refill to be completed.          Additional Information About Your Visit        Care EveryWhere ID     This is your Care EveryWhere ID. This could be used by other organizations to access your [place] medical records  [id number]        Your Vitals Were     Pulse Temperature Respirations BMI (Body Mass Index)          88 97.9  F (36.6  C) (Oral) 16 23.84 kg/m2         Blood Pressure from Last 3 Encounters:   07/04/18 112/60   11/08/17 100/68   04/19/17 110/62    Weight from Last 3 Encounters:   07/04/18 152 lb 3 oz (69 kg)   11/08/17 150 lb 3.2 oz (68.1 kg)   04/19/17 145 lb (65.8 kg)              We Performed the Following     Basic metabolic panel  (Ca, Cl, CO2, Creat, Gluc, K, Na, BUN)     CBC with platelets differential      CHLAMYDIA TRACHOMATIS PCR     NEISSERIA GONORRHOEA PCR     UA with Microscopic reflex to Culture          Today's Medication Changes          These changes are accurate as of 7/4/18  2:24 PM.  If you have any questions, ask your nurse or doctor.               Start taking these medicines.        Dose/Directions    cefTRIAXone 250 MG injection   Commonly known as:  ROCEPHIN   Used for:  Lower abdominal pain   Started by:  Lavell Buckley DO        Dose:  250 mg   Inject 250 mg into the muscle once for 1 dose   Quantity:  1.25 mL   Refills:  0       doxycycline 100 MG tablet   Commonly known as:  VIBRA-TABS   Used for:  Lower abdominal pain   Started by:  Lavell Buckley DO        Dose:  100 mg   Take 1 tablet (100 mg) by mouth 2 times daily for 10 days   Quantity:  20 tablet   Refills:  0       ondansetron 8 MG tablet   Commonly known as:  ZOFRAN   Used for:  Nausea and vomiting, intractability of vomiting not specified, unspecified vomiting type   Started by:  Lavell Buckley DO        Dose:  8 mg   Take 1 tablet (8 mg) by mouth every 8 hours as needed for nausea   Quantity:  9 tablet   Refills:  0            Where to get your medicines      These medications were sent to [address] [address]     Phone:  [phone]     doxycycline 100 MG tablet    ondansetron 8 MG tablet         Some of these will need a paper prescription and others can be bought over the counter.  Ask your nurse if you have questions.     You don't need a prescription for these medications     cefTRIAXone 250 MG injection                Primary Care Provider Fax #    Physician No Ref-Primary [phone]       No address on file        Equal Access to Services     [facility]: [place] Davenport, waaxda luqadaha, qaybta kaalmada lyssa, vel aggarwal. [facility] [phone].    ATENCIÓN: Si dale cam  adamson disposición servicios gratuitos de asistencia lingüística. Estrella khanna [phone].    We comply with applicable federal civil rights laws and [place] laws. We do not discriminate on the basis of race, color, national origin, age, disability, sex, sexual orientation, or gender identity.            Thank you!     Thank you for choosing [place] [facility]  for your care. Our goal is always to provide you with excellent care. Hearing back from our patients is one way we can continue to improve our services. Please take a few minutes to complete the written survey that you may receive in the mail after your visit with us. Thank you!             Your Updated Medication List - Protect others around you: Learn how to safely use, store and throw away your medicines at www.disposemymeds.org.          This list is accurate as of 7/4/18  2:24 PM.  Always use your most recent med list.                   Brand Name Dispense Instructions for use Diagnosis    ADVIL PO      Take 400 mg by mouth Reported on 4/19/2017        cefTRIAXone 250 MG injection    ROCEPHIN    1.25 mL    Inject 250 mg into the muscle once for 1 dose    Lower abdominal pain       doxycycline 100 MG tablet    VIBRA-TABS    20 tablet    Take 1 tablet (100 mg) by mouth 2 times daily for 10 days    Lower abdominal pain       nitroFURantoin (macrocrystal-monohydrate) 100 MG capsule    MACROBID    14 capsule    Take 1 capsule (100 mg) by mouth 2 times daily    Dysuria       ondansetron 8 MG tablet    ZOFRAN    9 tablet    Take 1 tablet (8 mg) by mouth every 8 hours as needed for nausea    Nausea and vomiting, intractability of vomiting not specified, unspecified vomiting type

## 2018-07-10 LAB
C TRACH DNA SPEC QL NAA+PROBE: NEGATIVE
N GONORRHOEA DNA SPEC QL NAA+PROBE: NEGATIVE
SPECIMEN SOURCE: NORMAL
SPECIMEN SOURCE: NORMAL

## 2019-06-02 ENCOUNTER — OFFICE VISIT (OUTPATIENT)
Dept: URGENT CARE | Facility: URGENT CARE | Age: 44
End: 2019-06-02
Payer: COMMERCIAL

## 2019-06-02 VITALS
WEIGHT: 154 LBS | OXYGEN SATURATION: 99 % | SYSTOLIC BLOOD PRESSURE: 122 MMHG | DIASTOLIC BLOOD PRESSURE: 79 MMHG | BODY MASS INDEX: 24.12 KG/M2 | HEART RATE: 60 BPM

## 2019-06-02 DIAGNOSIS — R51.9 NONINTRACTABLE HEADACHE, UNSPECIFIED CHRONICITY PATTERN, UNSPECIFIED HEADACHE TYPE: Primary | ICD-10-CM

## 2019-06-02 PROCEDURE — 99214 OFFICE O/P EST MOD 30 MIN: CPT | Performed by: FAMILY MEDICINE

## 2019-06-02 RX ORDER — SUMATRIPTAN 50 MG/1
50 TABLET, FILM COATED ORAL
Qty: 6 TABLET | Refills: 0 | Status: SHIPPED | OUTPATIENT
Start: 2019-06-02 | End: 2019-09-22

## 2019-06-02 NOTE — PROGRESS NOTES
SUBJECTIVE:  Daniella Castellanos is a 44 year old female who comes in for evaluation of headache.  Headache beganday(s)}ago and is still present  DESCRIPTION OF HEADACHE:   Location of pain: bilateral   Radiation of pain?: NO   Character of pain:dull   Severity of pain: moderate   Accompanying symptoms: nausea   Prodromal sx?: nausea   Rapidity of onset: gradual     History of Migranes: Yes   Are most headaches similar in presentation? YES    Neurologic ROS: Denies.    Past Medical History:   Diagnosis Date     Migraine      NO ACTIVE PROBLEMS      Current Outpatient Medications   Medication Sig Dispense Refill     SUMAtriptan (IMITREX) 50 MG tablet Take 1 tablet (50 mg) by mouth at onset of headache for migraine 6 tablet 0     Ibuprofen (ADVIL PO) Take 400 mg by mouth Reported on 4/19/2017       nitrofurantoin, macrocrystal-monohydrate, (MACROBID) 100 MG capsule Take 1 capsule (100 mg) by mouth 2 times daily (Patient not taking: Reported on 11/8/2017) 14 capsule 0     Social History     Tobacco Use     Smoking status: Former Smoker     Smokeless tobacco: Never Used   Substance Use Topics     Alcohol use: No     Alcohol/week: 0.0 oz       ROS:   CONSTITUTIONAL:NEGATIVE for fever, chills, change in weight  OBJECTIVE:  /79   Pulse 60   Wt 69.9 kg (154 lb)   SpO2 99%   BMI 24.12 kg/m    GENERAL APPEARANCE: healthy, alert and no distress  EYES: EOMI,  PERRL, conjunctiva clear  HENT: ear canals and TM's normal.  Nose and mouth without ulcers, erythema or lesions  NECK: supple, nontender, no lymphadenopathy  RESP: lungs clear to auscultation - no rales, rhonchi or wheezes  CV: regular rates and rhythm, normal S1 S2, no murmur noted  ABDOMEN:  soft, nontender, no HSM or masses and bowel sounds normal  NEURO: Normal strength and tone, sensory exam grossly normal,  normal speech and mentation  SKIN: no suspicious lesions or rashes    ASSESSMENT:    ICD-10-CM    1. Nonintractable headache, unspecified chronicity  pattern, unspecified headache type R51 SUMAtriptan (IMITREX) 50 MG tablet

## 2019-09-22 ENCOUNTER — OFFICE VISIT (OUTPATIENT)
Dept: URGENT CARE | Facility: URGENT CARE | Age: 44
End: 2019-09-22
Payer: COMMERCIAL

## 2019-09-22 VITALS
TEMPERATURE: 98.7 F | SYSTOLIC BLOOD PRESSURE: 118 MMHG | OXYGEN SATURATION: 98 % | BODY MASS INDEX: 23.51 KG/M2 | DIASTOLIC BLOOD PRESSURE: 70 MMHG | WEIGHT: 150.1 LBS | HEART RATE: 70 BPM | RESPIRATION RATE: 16 BRPM

## 2019-09-22 DIAGNOSIS — R51.9 NONINTRACTABLE HEADACHE, UNSPECIFIED CHRONICITY PATTERN, UNSPECIFIED HEADACHE TYPE: ICD-10-CM

## 2019-09-22 PROCEDURE — 99213 OFFICE O/P EST LOW 20 MIN: CPT | Performed by: HOSPITALIST

## 2019-09-22 RX ORDER — SUMATRIPTAN 50 MG/1
50 TABLET, FILM COATED ORAL
Qty: 30 TABLET | Refills: 0 | Status: SHIPPED | OUTPATIENT
Start: 2019-09-22

## 2019-09-22 NOTE — PROGRESS NOTES
Pt came here for sore throat, runny nose, cough and also headache. Pt has hx of miraine and run out of her sumatriptan. Pt denies any other complain    Allergies   Allergen Reactions     Codeine Sulfate Rash       Past Medical History:   Diagnosis Date     Migraine      NO ACTIVE PROBLEMS        Ibuprofen (ADVIL PO), Take 400 mg by mouth Reported on 4/19/2017  nitrofurantoin, macrocrystal-monohydrate, (MACROBID) 100 MG capsule, Take 1 capsule (100 mg) by mouth 2 times daily (Patient not taking: Reported on 11/8/2017)    No current facility-administered medications on file prior to visit.       Social History     Tobacco Use     Smoking status: Former Smoker     Smokeless tobacco: Never Used   Substance Use Topics     Alcohol use: No     Alcohol/week: 0.0 standard drinks       ROS:  12 point ROS is done and aside that mention above all other review of system is negative    OBJECTIVE:  /70 (BP Location: Left arm, Patient Position: Sitting, Cuff Size: Adult Regular)   Pulse 70   Temp 98.7  F (37.1  C) (Tympanic)   Resp 16   Wt 68.1 kg (150 lb 1.6 oz)   SpO2 98%   BMI 23.51 kg/m    GENERAL APPEARANCE: healthy, alert and mild distress  EYES: conjunctiva clear  EARS:no cerumen.   Ear canals no erythema, TM's intact no erythema .    NOSE/MOUTH: Nose and mouth is normal, no erythema or lesions  THROAT: no erythema w/ no tonsillar enlargement . positive exudates  NECK: supple, nontender, no lymphadenopathy  RESP: lungs clear to auscultation - no rales, rhonchi or wheezes  CV: regular rates and rhythm, normal S1 S2, no murmur noted  NEURO: awake, alert        No results found for this or any previous visit (from the past 168 hour(s)).     ASSESSMENT:     ICD-10-CM    1. Nonintractable headache, unspecified chronicity pattern, unspecified headache type R51 SUMAtriptan (IMITREX) 50 MG tablet         PLAN:    Seem to be migraine headache, will give sumatriptan. Seem to also has URI. Seem to be viral. Tylenol and  ibuprofen prn for  Pain or fever  Lots of rest and fluids.  Follow up in 4-7 days if not better or sooner if getting worse .    Lesly Fischer MD MD

## 2019-10-08 NOTE — PROGRESS NOTES
"  SUBJECTIVE:                                                   Daniella Castellanos is a 44 year old who presents to clinic today for the following health issue(s):  Patient presents with:  Abdominal Pain      HPI:  Daniella states that she has been having increased fatigue and nausea \"like morning sickness\" over the past 2-4 weeks.  She states that the pain is sharp in nature, occurs daily, lasts for 1-2 minutes, then resolves on own.  She denies any alleviating or worsening factors. She states that the nausea is occurring every morning, \"feels like I'm pregnant\", then resolves once she eats breakfast.  She denies any fever, chills, dysuria.  She states that her significant other lives in [facility] and last vaginal intercourse was in August.     No LMP recorded. Patient has had a hysterectomy.  Menstrual History: status post hysterectomy  Patient is sexually active  .  Using hysterectomy for contraception.   Health maintenance updated:  Due for pap if still has cervix  STI infx testing offered:  Declined    Last PHQ-9 score on record =   PHQ-9 SCORE 10/9/2019   PHQ-9 Total Score 0     Last GAD7 score on record =   ENEIDA-7 SCORE 10/9/2019   Total Score 0     Alcohol Score = 0    Problem list and histories reviewed & adjusted, as indicated.  Additional history: as documented.    Patient Active Problem List   Diagnosis     Surgical menopause     Past Surgical History:   Procedure Laterality Date     HYSTERECTOMY, VAGINAL  2009    per patient still has ovaries      Social History     Tobacco Use     Smoking status: Former Smoker     Smokeless tobacco: Never Used   Substance Use Topics     Alcohol use: No     Alcohol/week: 0.0 standard drinks      Problem (# of Occurrences) Relation (Name,Age of Onset)    Coronary Artery Disease (1) Mother    Other Cancer (1) Father:  Stomach Cancer            Current Outpatient Medications   Medication Sig     SUMAtriptan (IMITREX) 50 MG tablet Take 1 tablet (50 mg) by mouth " "at onset of headache for migraine     No current facility-administered medications for this visit.      Allergies   Allergen Reactions     Codeine Sulfate Rash       ROS:  12 point review of systems negative other than symptoms noted below.  Gastrointestinal: Abdominal Pain    OBJECTIVE:     /60   Pulse 64   Ht 1.702 m (5' 7\")   Wt 68.5 kg (151 lb)   Breastfeeding? No   BMI 23.65 kg/m    Body mass index is 23.65 kg/m .    PHYSICAL EXAM:  Constitutional:  Appearance: Well nourished, well developed alert, in no acute distress  Gastrointestinal:  Abdominal Examination:  Abdomen nontender to palpation, tone normal without rigidity or guarding, no masses present, umbilicus without lesions; Liver/Spleen:  No hepatomegaly present, liver nontender to palpation; Hernias:  No hernias present  Skin: General Inspection:  No rashes present, no lesions present, no areas of discoloration.  Neurologic:  Mental Status:  Oriented X3.  Normal strength and tone, sensory exam grossly normal, mentation intact and speech normal.    Psychiatric:  Mentation appears normal and affect normal/bright.  Pelvic Exam:  External Genitalia:     Normal appearance for age, no discharge present, no tenderness present, no inflammatory lesions present, color normal  Vagina:     Normal vaginal vault without central or paravaginal defects, no discharge present, no inflammatory lesions present, no masses present  Bladder:     Nontender to palpation  Urethra:   Urethral Body:  Urethra palpation normal, urethra structural support normal   Urethral Meatus:  No erythema or lesions present  Cervix:     Surgically absent  Uterus:     Surgically absent  Adnexa:     +adnexal tenderness present, no adnexal masses present  Perineum:     Perineum within normal limits, no evidence of trauma, no rashes or skin lesions present  Anus:     Anus within normal limits, no hemorrhoids present  Inguinal Lymph Nodes:     No lymphadenopathy present  Pubic Hair:  "    Normal pubic hair distribution for age  Genitalia and Groin:     No rashes present, no lesions present, no areas of discoloration, no masses present       In-Clinic Test Results:  Results for orders placed or performed in visit on 10/09/19 (from the past 24 hour(s))   Wet prep   Result Value Ref Range    Specimen Description Vagina     Wet Prep No Trichomonas seen     Wet Prep No clue cells seen     Wet Prep No yeast seen     Wet Prep No WBC's seen        ASSESSMENT/PLAN:                                                        ICD-10-CM    1. Lower abdominal pain R10.30 Wet prep     Chlamydia trachomatis PCR     Neisseria gonorrhoeae PCR     HIV Antigen Antibody Combo     Treponema Abs w Reflex to RPR and Titer     Hepatitis B surface antigen     Hepatitis C antibody     US Pelvic Complete w Transvaginal   2. Surgical menopause E89.40 US Pelvic Complete w Transvaginal   3. Routine screening for STI (sexually transmitted infection) Z11.3 Wet prep     Chlamydia trachomatis PCR     Neisseria gonorrhoeae PCR     HIV Antigen Antibody Combo     Treponema Abs w Reflex to RPR and Titer     Hepatitis B surface antigen     Hepatitis C antibody   4. Pelvic pain in female R10.2 US Pelvic Complete w Transvaginal       COUNSELING:  -Counseled on causes of pelvic pain, abd pain, and nausea.  Plan to r/o GYN causes of pain: STI, infection, ovarian cyst or other abnormalities.  -Counseled that cause may be GI related if GYN causes ruled out.  If pelvic US and other testing WNL, encouraged to f/u with primary care provider if pain continues/worsens      Catie OSORIO CNM

## 2019-10-09 ENCOUNTER — OFFICE VISIT (OUTPATIENT)
Dept: OBGYN | Facility: CLINIC | Age: 44
End: 2019-10-09
Payer: COMMERCIAL

## 2019-10-09 VITALS
HEIGHT: 67 IN | WEIGHT: 151 LBS | HEART RATE: 64 BPM | DIASTOLIC BLOOD PRESSURE: 60 MMHG | SYSTOLIC BLOOD PRESSURE: 104 MMHG | BODY MASS INDEX: 23.7 KG/M2

## 2019-10-09 DIAGNOSIS — Z11.3 ROUTINE SCREENING FOR STI (SEXUALLY TRANSMITTED INFECTION): ICD-10-CM

## 2019-10-09 DIAGNOSIS — R10.2 PELVIC PAIN IN FEMALE: ICD-10-CM

## 2019-10-09 DIAGNOSIS — E89.40 SURGICAL MENOPAUSE: ICD-10-CM

## 2019-10-09 DIAGNOSIS — R10.30 LOWER ABDOMINAL PAIN: Primary | ICD-10-CM

## 2019-10-09 LAB
HBV SURFACE AG SERPL QL IA: NONREACTIVE
HCV AB SERPL QL IA: NONREACTIVE
HIV 1+2 AB+HIV1 P24 AG SERPL QL IA: NONREACTIVE
SPECIMEN SOURCE: NORMAL
WET PREP SPEC: NORMAL

## 2019-10-09 PROCEDURE — 36415 COLL VENOUS BLD VENIPUNCTURE: CPT | Performed by: NURSE PRACTITIONER

## 2019-10-09 PROCEDURE — 87389 HIV-1 AG W/HIV-1&-2 AB AG IA: CPT | Performed by: NURSE PRACTITIONER

## 2019-10-09 PROCEDURE — 87210 SMEAR WET MOUNT SALINE/INK: CPT | Performed by: NURSE PRACTITIONER

## 2019-10-09 PROCEDURE — 87340 HEPATITIS B SURFACE AG IA: CPT | Performed by: NURSE PRACTITIONER

## 2019-10-09 PROCEDURE — 87491 CHLMYD TRACH DNA AMP PROBE: CPT | Performed by: NURSE PRACTITIONER

## 2019-10-09 PROCEDURE — 87591 N.GONORRHOEAE DNA AMP PROB: CPT | Performed by: NURSE PRACTITIONER

## 2019-10-09 PROCEDURE — 99213 OFFICE O/P EST LOW 20 MIN: CPT | Performed by: NURSE PRACTITIONER

## 2019-10-09 PROCEDURE — 86803 HEPATITIS C AB TEST: CPT | Performed by: NURSE PRACTITIONER

## 2019-10-09 PROCEDURE — 86780 TREPONEMA PALLIDUM: CPT | Performed by: NURSE PRACTITIONER

## 2019-10-09 ASSESSMENT — ANXIETY QUESTIONNAIRES
6. BECOMING EASILY ANNOYED OR IRRITABLE: NOT AT ALL
2. NOT BEING ABLE TO STOP OR CONTROL WORRYING: NOT AT ALL
1. FEELING NERVOUS, ANXIOUS, OR ON EDGE: NOT AT ALL
5. BEING SO RESTLESS THAT IT IS HARD TO SIT STILL: NOT AT ALL
3. WORRYING TOO MUCH ABOUT DIFFERENT THINGS: NOT AT ALL
7. FEELING AFRAID AS IF SOMETHING AWFUL MIGHT HAPPEN: NOT AT ALL
4. TROUBLE RELAXING: NOT AT ALL
GAD7 TOTAL SCORE: 0

## 2019-10-09 ASSESSMENT — MIFFLIN-ST. JEOR: SCORE: 1362.56

## 2019-10-09 ASSESSMENT — PATIENT HEALTH QUESTIONNAIRE - PHQ9: SUM OF ALL RESPONSES TO PHQ QUESTIONS 1-9: 0

## 2019-10-09 NOTE — RESULT ENCOUNTER NOTE
Please call patient with neg wet prep results.  She will be notified with her remaining results when they are available.     Thanks!  Catie OSORIO CNM

## 2019-10-09 NOTE — PATIENT INSTRUCTIONS
Patient Education     Unknown Causes of Abdominal Pain (Female)    The exact cause of your belly (abdominal) pain is not clear. This does not mean that this is something to worry about. Everyone likes to know the exact cause of the problem. But sometimes with belly pain, there is no clear-cut cause, and this could be a good thing. The good news is that your symptoms can be treated, and you will feel better.   Your condition does not seem serious now. But sometimes the signs of a serious problem may take more time to appear. For this reason, it is important for you to watch for any new symptoms, problems, or worsening of your condition.  Over the next few days, the abdominal pain may come and go. Or it may be constant. Other common symptoms can include nausea and vomiting. Sometimes it can be difficult to tell if you feel nauseous. You may just feel bad and not connect that feeling to nausea. Constipation, diarrhea, and a fever may go along with the pain.  The pain may continue even if treated correctly over the following days. Depending on how things go, sometimes the cause can become clear and may need more or different treatment. Additional evaluations, medicines, or tests may also be needed.  Home care  Your healthcare provider may prescribe medicine for pain, symptoms, or an infection.  Follow the healthcare provider's instructions for taking these medicines.  General care    Rest as much as you can until your next exam. No strenuous activities.    Try to find positions that ease discomfort. A small pillow placed on the abdomen may help relieve pain.    Something warm on your abdomen (such as a heating pad) may help, but be careful not to burn yourself.  Diet    Don t force yourself to eat, especially if having cramps, vomiting, or diarrhea.    Water is important so you don't get dehydrated. Soup may also be good. Sports drinks may also help, especially if they are not too acidic. Don't drink sugary drinks as  this can make things worse. Take liquids in small amounts. Don t guzzle them.    Caffeine sometimes makes the pain and cramping worse.    Don t take dairy products if you have vomiting or diarrhea.    Don't eat large amounts at a time. Wait a few minutes between bites.    Eat a diet low in fiber (called a low-residue diet). Foods allowed include refined breads, white rice, fruit and vegetable juices without pulp, tender meats. These foods will pass more easily through the intestine.    Don t have whole-grain foods, whole fruits and vegetables, meats, seeds and nuts, fried or fatty foods, dairy, alcohol and spicy foods until your symptoms go away.  Follow-up care  Follow up with your healthcare provider, or as advised, if your pain does not begin to improve in the next 24 hours.  Call 911  Call 911 if any of these occur:    Trouble breathing    Confusion    Fainting or loss of consciousness    Rapid heart rate    Seizure  When to seek medical advice  Call your healthcare provider right away if any of these occur:    Pain gets worse or moves to the right lower abdomen    New or worsening vomiting or diarrhea    Swelling of the abdomen    Unable to pass stool for more than 3 days    Fever of 100.4 F (38 C) or higher, or as directed by your healthcare provider.    Blood in vomit or bowel movements (dark red or black color)    Yellow color of eyes and skin (jaundice)    Weakness, dizziness    Chest, arm, back, neck, or jaw pain    Unexpected vaginal bleeding or missed period    Can't keep down liquids or water and you are getting dehydrated  Date Last Reviewed: 6/1/2018 2000-2018 [employer]. [address] [place] [address], [place] [address]. All rights reserved. This information is not intended as a substitute for professional medical care. Always follow your healthcare professional's instructions.

## 2019-10-10 LAB
C TRACH DNA SPEC QL NAA+PROBE: NEGATIVE
N GONORRHOEA DNA SPEC QL NAA+PROBE: NEGATIVE
SPECIMEN SOURCE: NORMAL
SPECIMEN SOURCE: NORMAL
T PALLIDUM AB SER QL: NONREACTIVE

## 2019-10-10 ASSESSMENT — ANXIETY QUESTIONNAIRES: GAD7 TOTAL SCORE: 0

## 2019-10-11 NOTE — RESULT ENCOUNTER NOTE
Please call Daniella and let her know that all of her results came back as negative for STIs.  Thanks!    Catie OSORIO CNM

## 2019-10-14 ENCOUNTER — ANCILLARY PROCEDURE (OUTPATIENT)
Dept: ULTRASOUND IMAGING | Facility: CLINIC | Age: 44
End: 2019-10-14
Payer: COMMERCIAL

## 2019-10-14 DIAGNOSIS — R10.2 PELVIC PAIN IN FEMALE: ICD-10-CM

## 2019-10-14 DIAGNOSIS — E89.40 SURGICAL MENOPAUSE: ICD-10-CM

## 2019-10-14 DIAGNOSIS — R10.30 LOWER ABDOMINAL PAIN: ICD-10-CM

## 2019-10-14 PROCEDURE — 76830 TRANSVAGINAL US NON-OB: CPT | Performed by: OBSTETRICS & GYNECOLOGY

## 2019-10-14 PROCEDURE — 76856 US EXAM PELVIC COMPLETE: CPT | Performed by: OBSTETRICS & GYNECOLOGY

## 2019-10-16 ENCOUNTER — TELEPHONE (OUTPATIENT)
Dept: OBGYN | Facility: CLINIC | Age: 44
End: 2019-10-16

## 2022-01-09 ENCOUNTER — OFFICE VISIT (OUTPATIENT)
Dept: URGENT CARE | Facility: URGENT CARE | Age: 47
End: 2022-01-09
Payer: COMMERCIAL

## 2022-01-09 VITALS
HEART RATE: 74 BPM | DIASTOLIC BLOOD PRESSURE: 83 MMHG | TEMPERATURE: 98.2 F | OXYGEN SATURATION: 98 % | SYSTOLIC BLOOD PRESSURE: 136 MMHG | RESPIRATION RATE: 18 BRPM

## 2022-01-09 DIAGNOSIS — G44.89 OTHER HEADACHE SYNDROME: ICD-10-CM

## 2022-01-09 DIAGNOSIS — J11.1 INFLUENZA-LIKE ILLNESS: Primary | ICD-10-CM

## 2022-01-09 DIAGNOSIS — R50.9 FEVER IN ADULT: ICD-10-CM

## 2022-01-09 LAB
FLUAV AG SPEC QL IA: NEGATIVE
FLUBV AG SPEC QL IA: NEGATIVE

## 2022-01-09 PROCEDURE — 87804 INFLUENZA ASSAY W/OPTIC: CPT | Performed by: PHYSICIAN ASSISTANT

## 2022-01-09 PROCEDURE — U0005 INFEC AGEN DETEC AMPLI PROBE: HCPCS | Performed by: PHYSICIAN ASSISTANT

## 2022-01-09 PROCEDURE — 99213 OFFICE O/P EST LOW 20 MIN: CPT | Performed by: PHYSICIAN ASSISTANT

## 2022-01-09 PROCEDURE — U0003 INFECTIOUS AGENT DETECTION BY NUCLEIC ACID (DNA OR RNA); SEVERE ACUTE RESPIRATORY SYNDROME CORONAVIRUS 2 (SARS-COV-2) (CORONAVIRUS DISEASE [COVID-19]), AMPLIFIED PROBE TECHNIQUE, MAKING USE OF HIGH THROUGHPUT TECHNOLOGIES AS DESCRIBED BY CMS-2020-01-R: HCPCS | Performed by: PHYSICIAN ASSISTANT

## 2022-01-09 RX ORDER — IBUPROFEN 800 MG/1
800 TABLET, FILM COATED ORAL EVERY 8 HOURS PRN
Qty: 20 TABLET | Refills: 0 | Status: SHIPPED | OUTPATIENT
Start: 2022-01-09

## 2022-01-09 NOTE — PROGRESS NOTES
Assessment & Plan     Influenza-like illness  Rapid influenza is negative today.  We discussed most likely viral illness at this time.  Ibuprofen is prescribed as needed for body aches and headache.  Supportive care measures advised.  Covid test is pending.  Keep monitoring symptoms.  Follow-up if any worsening symptoms.  Patient agrees with the plan.  - ibuprofen (ADVIL/MOTRIN) 800 MG tablet  Dispense: 20 tablet; Refill: 0    Fever in adult  She is afebrile here.  Lungs are clear on exam.  She is in no acute distress.  Covid test is pending at this time.  Supportive care measures advised.  Keep monitoring symptoms.  Follow-up if any worsening symptoms.  Patient agrees with the plan.  - Symptomatic; Yes; 1/9/2022 COVID-19 Virus (Coronavirus) by PCR Nose  - Influenza A/B antigen    Other headache syndrome  We discussed most likely viral illness.  Ibuprofen is prescribed as needed for the headache.  Push fluids.  Rest.  Keep monitoring symptoms.  Follow-up if any worsening symptoms.  Patient agrees with the plan.  - ibuprofen (ADVIL/MOTRIN) 800 MG tablet  Dispense: 20 tablet; Refill: 0         Return in about 1 week (around 1/16/2022) for Symptoms failing to improve.    MATTHEW [facility] ERNST Brewer is a 46 year old female who presents to clinic today for the following health issues:  Chief Complaint   Patient presents with     Fever     fever, body aches, nausea, and headache X 1 day     HPI    Patient is presenting to urgent care today with a complaint of fever, body aches, nausea and headache.  Onset of symptoms yesterday.  She denies any cough or sore throat.  No chest pain or shortness of breath.  No vomiting or diarrhea.  No obvious exposure to anyone with Covid or influenza.  Treatment tried: Tylenol      Review of Systems  Constitutional, HEENT, cardiovascular, pulmonary, GI, , musculoskeletal, neuro, skin, endocrine and psych systems are negative,  except as otherwise noted.      Objective    /83   Pulse 74   Temp 98.2  F (36.8  C) (Tympanic)   Resp 18   SpO2 98%   Physical Exam   GENERAL: healthy, alert and no distress  HENT: ear canals and TM's normal,  mouth without ulcers or lesions  RESP: lungs clear to auscultation - no rales, rhonchi or wheezes  CV: regular rate and rhythm, normal S1 S2  MS: no gross musculoskeletal defects noted, no edema  SKIN: no suspicious lesions or rashes  NEURO: She is alert and oriented, mentation is intact    Results for orders placed or performed in visit on 01/09/22 (from the past 24 hour(s))   Influenza A/B antigen    Specimen: Nasopharyngeal; Swab   Result Value Ref Range    Influenza A antigen Negative Negative    Influenza B antigen Negative Negative    Narrative    Test results must be correlated with clinical data. If necessary, results should be confirmed by a molecular assay or viral culture.

## 2022-01-09 NOTE — PATIENT INSTRUCTIONS
"  Patient Education   After Your COVID-19 (Coronavirus) Test  You have been tested for COVID-19 (coronavirus).   If you'll have surgery in the next few days, we'll let you know ahead of time if you have the virus. Please call your surgeon's office with any questions.  For all other patients: Results are usually available in FindIt within 2 to 3 days.   If you do not have a FindIt account, you'll get a letter in the mail in about 7 to 10 days.   [employer] is often the fastest way to get test results. Please sign up if you do not already have a FindIt account. See the handout Getting COVID-19 Test Results in FindIt for help.  What if my test result is positive?  If your test is positive and you have not viewed your result in FindIt, you'll get a phone call with your result. (A positive test means that you have the virus.)     Follow the tips under \"How do I self-isolate?\" below for 10 days (20 days if you have a weak immune system).    You don't need to be retested for COVID-19 before going back to school or work. As long as you're fever-free and feeling better, you can go back to school, work and other activities after waiting the 10 or 20 days.  What if I have questions after I get my results?  If you have questions about your results, please visit our testing website at [email or web address].   After 7 to 10 days, if you have not gotten your results:     Call [phone] ([phone]) and ask to speak with our COVID-19 results team.    If you're being treated at an infusion center: Call your infusion center directly.  What are the symptoms of COVID-19?  Cough, fever and trouble breathing are the most common signs of COVID-19.  Other symptoms can include new headaches, new muscle or body aches, new and unexplained fatigue (feeling very tired), chills, sore throat, congestion (stuffy or runny nose), diarrhea (loose poop), loss of taste or smell, belly pain, and nausea or vomiting " "(feeling sick to your stomach or throwing up).  You may already have symptoms of COVID-19, or they may show up later.  What should I do if I have symptoms?  If you're having surgery: Call your surgeon's office.  For all other patients: Stay home and away from others (self-isolate) until ...    You've had no fever--and no medicine that reduces fever--for 1 full day (24 hours), AND    Other symptoms have gotten better. For example, your cough or breathing has improved, AND    At least 10 days have passed since your symptoms first started.  How do I self-isolate?    Stay in your own room, even for meals. Use your own bathroom if you can.    Stay away from others in your home. No hugging, kissing or shaking hands. No visitors.    Don't go to work, school or anywhere else.    Clean \"high touch\" surfaces often (doorknobs, counters, handles). Use household cleaning spray or wipes. You'll find a full list of  on the EPA website: www.epa.gov/pesticide-registration/list-n-disinfectants-use-against-sars-cov-2.    Cover your mouth and nose with a mask or other face covering to avoid spreading germs.    Wash your hands and face often. Use soap and water.    Caregivers in these groups are at risk for severe illness due to COVID-19:  ? People 65 years and older  ? People who live in a nursing home or long-term care facility  ? People with chronic disease (lung, heart, cancer, diabetes, kidney, liver, immunologic)  ? People who have a weakened immune system, including those who:    Are in cancer treatment    Take medicine that weakens the immune system, such as corticosteroids    Had a bone marrow or organ transplant    Have an immune deficiency    Have poorly controlled HIV or AIDS    Are obese (body mass index of 40 or higher)    Smoke regularly    Caregivers should wear gloves while washing dishes, handling laundry and cleaning bedrooms and bathrooms.    Use caution when washing and drying laundry: Don't shake dirty " laundry and use the warmest water setting that you can.    For more tips on managing your health at home, go to www.cdc.gov/coronavirus/2019-ncov/downloads/10Things.pdf.  How can I take care of myself at home?  1. Get lots of rest. Drink extra fluids (unless a doctor has told you not to).  2. Take Tylenol (acetaminophen) for fever or pain. If you have liver or kidney problems, ask your family doctor if it's OK to take Tylenol.   Adults can take either:  ? 650 mg (two 325 mg pills) every 4 to 6 hours, or   ? 1,000 mg (two 500 mg pills) every 8 hours as needed.  ? Note: Don't take more than 3,000 mg in one day. Acetaminophen is found in many medicines (both prescribed and over-the-counter medicines). Read all labels to be sure you don't take too much.   For children, check the Tylenol bottle for the right dose. The dose is based on the child's age or weight.  3. If you have other health problems (like cancer, heart failure, an organ transplant or severe kidney disease): Call your specialty clinic if you don't feel better in the next 2 days.  4. Know when to call 911. Emergency warning signs include:  ? Trouble breathing or shortness of breath  ? Chest pain or pressure that doesn't go away  ? Feeling confused like you haven't felt before, or not being able to wake up  ? Bluish-colored lips or face  5. If your doctor prescribed a blood thinner medicine: Follow their instructions.  Where can I get more information?    [facility] - About COVID-19:   [email or web address]    CDC - If You're Sick: cdc.gov/coronavirus/2019-ncov/about/steps-when-sick.html    CDC - Ending Home Isolation: www.cdc.gov/coronavirus/2019-ncov/hcp/disposition-in-home-patients.html    CDC - Caring for Someone: www.cdc.gov/coronavirus/2019-ncov/if-you-are-sick/care-for-someone.html    [facility] - Interim Guidance for Hospital Discharge to Home: www.health.[facility].[place]/diseases/coronavirus/hcp/hospdischarge.pdf    [facility]  clinical trials (COVID-19 research studies): [email or web address]    Below are the COVID-19 hotlines at the [place] Department of Health ([facility]). Interpreters are available.  ? For health questions: Call 412-088-4847 or 1-272.484.2194 (7 a.m. to 7 p.m.)  ? For questions about schools and childcare: Call 802-930-5645 or 1-605.631.5212 (7 a.m. to 7 p.m.)    For informational purposes only. Not to replace the advice of your health care provider. Clinically reviewed by Infection Prevention and the [facility] COVID-19 Clinical Team. Copyright   2020 [employer]. All rights reserved. [employer] [id number] - Rev 11/11/20.       Patient Education     Influenza (Adult)    Influenza is also called the flu. It's a viral illness that affects the air passages of your lungs. It's different from the common cold. The flu can easily be passed from one to person to another. It may be spread through the air by coughing and sneezing. Or it can be spread by touching the sick person and then touching your own eyes, nose, or mouth.   The flu starts 1 to 3 days after you are exposed to the flu virus. It may last for 1 to 2 weeks but sometimes people feel tired or fatigued for many weeks afterward. You usually don t need to take antibiotics unless you are at high risk for or have a complication . This might be an ear or sinus infection or pneumonia.   Symptoms of the flu may be mild or severe. They can include extreme tiredness (wanting to stay in bed all day), chills, fevers, muscle aches, soreness with eye movement, headache, and a dry, hacking cough.   Antiviral medicine for the flu is available by prescription. If you start taking it within 48 hours, it may help reduce how long your symptoms last and how severe they are. Your provider may do a test to find out if you have influenza and which strain you have.   Home care  Follow these guidelines when caring for yourself at home:    Stay away from  cigarette smoke, whether yours or other people s.    Acetaminophen or ibuprofen will help ease your fever, muscle aches, and headache. Don t give aspirin to anyone younger than 18 who has the flu. This can cause a serious condition called Reye syndrome.    Nausea, loose stools, and loss of appetite are common with the flu. Eat light meals. Drink 6 to 8 glasses of liquids every day. Good choices are water, sport drinks, soft drinks without caffeine, juices, tea, and soup. Extra fluids will also help loosen secretions in your nose and lungs.    Over-the-counter cold medicines will not make the flu go away faster. But the medicines may help with coughing, sore throat, and congestion in your nose and sinuses. Don t use a decongestant if you have high blood pressure.    Stay home until your fever has been gone for at least 24 hours without using medicine to reduce fever.  Follow-up care  Follow up with your healthcare provider, or as advised, if you are not getting better over the next week.   If you are age 65 or older, talk with your provider about getting a pneumococcal vaccine every 5 years. You should also get this vaccine if you have chronic asthma or COPD. All adults should get a flu vaccine every fall. Ask your provider about this.   When to seek medical advice  Call your healthcare provider right away if you have the flu and any of these occur:     Cough with lots of colored mucus (sputum) or blood in your mucus    Chest pain, shortness of breath, wheezing, or trouble breathing    Severe headache, or face, neck, or ear pain    New rash with fever    Fever of 100.4 F (38 C) or higher, or as directed by your healthcare provider    Confusion, behavior change, or seizure    Severe weakness or dizziness    You get a new fever or cough after getting better for a few days  Also call your provider if you have flu symptoms and have a weakened immune system or are taking medicines that can weaken your immune system. These  include steroids and certain anti-inflammatory medicines.   [employer] last reviewed this educational content on 10/1/2019    2151-0056 [employer]. All rights reserved. This information is not intended as a substitute for professional medical care. Always follow your healthcare professional's instructions.

## 2022-01-10 LAB — SARS-COV-2 RNA RESP QL NAA+PROBE: POSITIVE

## 2022-01-11 ENCOUNTER — TELEPHONE (OUTPATIENT)
Dept: URGENT CARE | Facility: URGENT CARE | Age: 47
End: 2022-01-11
Payer: COMMERCIAL

## 2022-01-11 NOTE — TELEPHONE ENCOUNTER
"Coronavirus (COVID-19) Notification    Caller Name (Patient, parent, daughter/son, grandparent, etc)  patient    Reason for call  Notify of Positive Coronavirus (COVID-19) lab results, assess symptoms,  review  [facility] recommendations    Lab Result    Lab test:  2019-nCoV rRt-PCR or SARS-CoV-2 PCR    Oropharyngeal AND/OR nasopharyngeal swabs is POSITIVE for 2019-nCoV RNA/SARS-COV-2 PCR (COVID-19 virus)    RN Recommendations/Instructions per [facility] Coronavirus COVID-19 recommendations    Brief introduction script  Introduce self then review script:  \"I am calling on behalf of [employer].  We were notified that your Coronavirus test (COVID-19) for was POSITIVE for the virus.  I have some information to relay to you but first I wanted to mention that the [place] Dept of Health will be contacting you shortly [it's possible MD already called Patient] to talk to you more about how you are feeling and other people you have had contact with who might now also have the virus.  Also,  [facility] is Partnering with the [facility] for Covid-19 research, you may be contacted directly by research staff.\"    Assessment (Inquire about Patient's current symptoms)   Assessment   Current Symptoms at time of phone call: (if no symptoms, document No symptoms] No Sx   Symptoms onset (if applicable) 1/8/22     If at time of call, Patients symptoms hare worsened, the Patient should contact 911 or have someone drive them to Emergency Dept promptly:      If Patient calling 911, inform 911 personal that you have tested positive for the Coronavirus (COVID-19).  Place mask on and await 911 to arrive.    If Emergency Dept, If possible, please have another adult drive you to the Emergency Dept but you need to wear mask when in contact with other people.      Monoclonal Antibody Administration    You may be eligible to receive a new treatment with a monoclonal antibody for preventing hospitalization in patients at " high risk for complications from COVID-19.   This medication is still experimental and available on a limited basis; it is given through an IV and must be given at an infusion center. Please note that not all people who are eligible will receive the medication since it is in limited supply.     Are you interested in being considered for this medication?  No.   Does the patient fit the criteria: No    Review information with Patient    Your result was positive. This means you have COVID-19 (coronavirus).  We have sent you a letter that reviews the information that I'll be reviewing with you now.    How can I protect others?    If you have symptoms: stay home and away from others (self-isolate) until:    You've had no fever--and no medicine that reduces fever--for 1 full day (24 hours). And       Your other symptoms have gotten better. For example, your cough or breathing has improved. And     At least 10 days have passed since your symptoms started. (If you've been told by a doctor that you have a weak immune system, wait 20 days.)     If you don't have symptoms: Stay home and away from others (self-isolate) until at least 10 days have passed since your first positive COVID-19 test. (Date test collected)    During this time:    Stay in your own room, including for meals. Use your own bathroom if you can.    Stay away from others in your home. No hugging, kissing or shaking hands. No visitors.     Don't go to work, school or anywhere else.     Clean  high touch  surfaces often (doorknobs, counters, handles, etc.). Use a household cleaning spray or wipes. You'll find a full list on the EPA website at www.epa.gov/pesticide-registration/list-n-disinfectants-use-against-sars-cov-2.     Cover your mouth and nose with a mask, tissue or other face covering to avoid spreading germs.    Wash your hands and face often with soap and water.    Make a list of people you have been in close contact with recently, even if either of  you wore a face covering.   - Start your list from 2 days before you became ill or had a positive test.  - Include anyone that was within 6 feet of you for a cumulative total of 15 minutes or more in 24 hours. (Example: if you sat next to Jessee for 5 minutes in the morning and 10 minutes in the afternoon, then you were in close contact for 15 minutes total that day. Jessee would be added to your list.)    A public health worker will call or text you. It is important that you answer. They will ask you questions about possible exposures to COVID-19, such as people you have been in direct contact with and places you have visited.    Tell the people on your list that you have COVID-19; they should stay away from others for 14 days starting from the last time they were in contact with you (unless you are told something different from a public health worker).     Caregivers in these groups are at risk for severe illness due to COVID-19:  o People 65 years and older  o People who live in a nursing home or long-term care facility  o People with chronic disease (lung, heart, cancer, diabetes, kidney, liver, immunologic)  o People who have a weakened immune system, including those who:  - Are in cancer treatment  - Take medicine that weakens the immune system, such as corticosteroids  - Had a bone marrow or organ transplant  - Have an immune deficiency  - Have poorly controlled HIV or AIDS  - Are obese (body mass index of 40 or higher)  - Smoke regularly    Caregivers should wear gloves while washing dishes, handling laundry and cleaning bedrooms and bathrooms.    Wash and dry laundry with special caution. Don't shake dirty laundry, and use the warmest water setting you can.    If you have a weakened immune system, ask your doctor about other actions you should take.    For more tips, go to www.cdc.gov/coronavirus/2019-ncov/downloads/10Things.pdf.    You should not go back to work until you meet the guidelines above for ending  your home isolation. You don't need to be retested for COVID-19 before going back to work--studies show that you won't spread the virus if it's been at least 10 days since your symptoms started (or 20 days, if you have a weak immune system).    Employers: This document serves as formal notice of your employee's medical guidelines for going back to work. They must meet the above guidelines before going back to work in person.    How can I take care of myself?    1. Get lots of rest. Drink extra fluids (unless a doctor has told you not to).    2. Take Tylenol (acetaminophen) for fever or pain. If you have liver or kidney problems, ask your family doctor if it's okay to take Tylenol.     Take either:     650 mg (two 325 mg pills) every 4 to 6 hours, or     1,000 mg (two 500 mg pills) every 8 hours as needed.     Note: Don't take more than 3,000 mg in one day. Acetaminophen is found in many medicines (both prescribed and over-the-counter medicines). Read all labels to be sure you don't take too much.    For children, check the Tylenol bottle for the right dose (based on their age or weight).    3. If you have other health problems (like cancer, heart failure, an organ transplant or severe kidney disease): Call your specialty clinic if you don't feel better in the next 2 days.    4. Know when to call 911: Emergency warning signs include:    Trouble breathing or shortness of breath    Pain or pressure in the chest that doesn't go away    Feeling confused like you haven't felt before, or not being able to wake up    Bluish-colored lips or face    5. Sign up for [employer]. We know it's scary to hear that you have COVID-19. We want to track your symptoms to make sure you're okay over the next 2 weeks. Please look for an email from [employer]--this is a free, online program that we'll use to keep in touch. To sign up, follow the link in the email. Learn more at [email or web address].    Where can I get more  information?    [facility]: [email or web address]    Coronavirus Basics: www.health.[facility].[place]/diseases/coronavirus/basics.html    What to Do If You're Sick: www.cdc.gov/coronavirus/2019-ncov/about/steps-when-sick.html    Ending Home Isolation: www.cdc.gov/coronavirus/2019-ncov/hcp/disposition-in-home-patients.html     Caring for Someone with COVID-19: www.cdc.gov/coronavirus/2019-ncov/if-you-are-sick/care-for-someone.html     [facility] clinical trials (COVID-19 research studies): [email or web address]-clinical-trials     A Positive COVID-19 letter will be sent via [employer] or the mail. (Exception, no letters sent to Presurgerical/Preprocedure Patients)    Shana Rankin LPN

## 2022-01-11 NOTE — TELEPHONE ENCOUNTER
Coronavirus (COVID-19) Notification    Reason for call  Notify of POSITIVE  COVID-19 lab result, assess symptoms,  review [facility] recommendations    Lab Result   Lab test for 2019-nCoV rRt-PCR or SARS-COV-2 PCR  Oropharyngeal AND/OR nasopharyngeal swabs were POSITIVE for 2019-nCoV RNA [OR] SARS-COV-2 RNA (COVID-19) RNA     We have been unable to reach Patient by phone at this time to notify of their Positive COVID-19 result.  Left voicemail message requesting a call back to [phone] [facility] for results.        POSITIVE COVID-19 Letter sent.    Katie Garcia LPN